# Patient Record
Sex: FEMALE | Race: WHITE
[De-identification: names, ages, dates, MRNs, and addresses within clinical notes are randomized per-mention and may not be internally consistent; named-entity substitution may affect disease eponyms.]

---

## 2017-06-03 ENCOUNTER — TRANSCRIPTION ENCOUNTER (OUTPATIENT)
Age: 37
End: 2017-06-03

## 2017-06-12 ENCOUNTER — APPOINTMENT (OUTPATIENT)
Dept: SURGERY | Facility: CLINIC | Age: 37
End: 2017-06-12

## 2017-06-12 VITALS
OXYGEN SATURATION: 98 % | WEIGHT: 152.31 LBS | RESPIRATION RATE: 16 BRPM | DIASTOLIC BLOOD PRESSURE: 77 MMHG | HEIGHT: 61 IN | HEART RATE: 100 BPM | TEMPERATURE: 97.9 F | BODY MASS INDEX: 28.76 KG/M2 | SYSTOLIC BLOOD PRESSURE: 115 MMHG

## 2017-06-12 DIAGNOSIS — L72.0 EPIDERMAL CYST: ICD-10-CM

## 2017-06-12 DIAGNOSIS — Z86.39 PERSONAL HISTORY OF OTHER ENDOCRINE, NUTRITIONAL AND METABOLIC DISEASE: ICD-10-CM

## 2017-07-01 ENCOUNTER — TRANSCRIPTION ENCOUNTER (OUTPATIENT)
Age: 37
End: 2017-07-01

## 2018-03-06 ENCOUNTER — APPOINTMENT (OUTPATIENT)
Dept: RHEUMATOLOGY | Facility: CLINIC | Age: 38
End: 2018-03-06
Payer: MEDICAID

## 2018-03-06 ENCOUNTER — LABORATORY RESULT (OUTPATIENT)
Age: 38
End: 2018-03-06

## 2018-03-06 VITALS
DIASTOLIC BLOOD PRESSURE: 80 MMHG | SYSTOLIC BLOOD PRESSURE: 118 MMHG | BODY MASS INDEX: 26.43 KG/M2 | HEART RATE: 82 BPM | WEIGHT: 140 LBS | TEMPERATURE: 98.7 F | HEIGHT: 61 IN | OXYGEN SATURATION: 98 %

## 2018-03-06 DIAGNOSIS — R31.29 OTHER MICROSCOPIC HEMATURIA: ICD-10-CM

## 2018-03-06 PROCEDURE — 99205 OFFICE O/P NEW HI 60 MIN: CPT

## 2018-03-09 LAB
ALBUMIN SERPL ELPH-MCNC: 4.5 G/DL
ALP BLD-CCNC: 68 U/L
ALT SERPL-CCNC: 7 U/L
ANA SER IF-ACNC: NEGATIVE
ANION GAP SERPL CALC-SCNC: 13 MMOL/L
APPEARANCE: CLEAR
APTT BLD: 34.8 SEC
AST SERPL-CCNC: 11 U/L
B2 GLYCOPROT1 AB SER QL: NEGATIVE
BACTERIA: NEGATIVE
BASOPHILS # BLD AUTO: 0.04 K/UL
BASOPHILS NFR BLD AUTO: 0.3 %
BILIRUB SERPL-MCNC: 0.3 MG/DL
BILIRUBIN URINE: NEGATIVE
BLOOD URINE: NEGATIVE
BUN SERPL-MCNC: 6 MG/DL
CALCIUM SERPL-MCNC: 9.3 MG/DL
CARDIOLIPIN AB SER IA-ACNC: NEGATIVE
CENTROMERE IGG SER-ACNC: <0.2 AL
CHLORIDE SERPL-SCNC: 101 MMOL/L
CO2 SERPL-SCNC: 25 MMOL/L
COLOR: YELLOW
CREAT SERPL-MCNC: 0.62 MG/DL
CREAT SPEC-SCNC: 73 MG/DL
CREAT/PROT UR: 0.1 RATIO
CRP SERPL-MCNC: 0.2 MG/DL
DSDNA AB SER-ACNC: <12 IU/ML
ENA RNP AB SER IA-ACNC: <0.2 AL
ENA SCL70 IGG SER IA-ACNC: <0.2 AL
ENA SM AB SER IA-ACNC: <0.2 AL
ENA SS-A AB SER IA-ACNC: <0.2 AL
ENA SS-B AB SER IA-ACNC: <0.2 AL
EOSINOPHIL # BLD AUTO: 0.15 K/UL
EOSINOPHIL NFR BLD AUTO: 1.3 %
ERYTHROCYTE [SEDIMENTATION RATE] IN BLOOD BY WESTERGREN METHOD: 14 MM/HR
G6PD SER-CCNC: 13.7 U/G HGB
GLUCOSE QUALITATIVE U: NEGATIVE MG/DL
GLUCOSE SERPL-MCNC: 90 MG/DL
HCT VFR BLD CALC: 43.4 %
HGB BLD-MCNC: 14.4 G/DL
HYALINE CASTS: 0 /LPF
IMM GRANULOCYTES NFR BLD AUTO: 0.3 %
KETONES URINE: NEGATIVE
LEUKOCYTE ESTERASE URINE: NEGATIVE
LYMPHOCYTES # BLD AUTO: 3.91 K/UL
LYMPHOCYTES NFR BLD AUTO: 32.6 %
MAN DIFF?: NORMAL
MCHC RBC-ENTMCNC: 32.2 PG
MCHC RBC-ENTMCNC: 33.2 GM/DL
MCV RBC AUTO: 97.1 FL
MICROSCOPIC-UA: NORMAL
MONOCYTES # BLD AUTO: 0.48 K/UL
MONOCYTES NFR BLD AUTO: 4 %
MPO AB + PR3 PNL SER: NORMAL
NEUTROPHILS # BLD AUTO: 7.37 K/UL
NEUTROPHILS NFR BLD AUTO: 61.5 %
NITRITE URINE: NEGATIVE
PH URINE: 5
PLATELET # BLD AUTO: 278 K/UL
POTASSIUM SERPL-SCNC: 4 MMOL/L
PROT SERPL-MCNC: 7.1 G/DL
PROT UR-MCNC: 5 MG/DL
PROTEIN URINE: NEGATIVE MG/DL
RBC # BLD: 4.47 M/UL
RBC # FLD: 12.7 %
RED BLOOD CELLS URINE: 5 /HPF
RNA POLYMERASE III IGG: <10 U
SODIUM SERPL-SCNC: 139 MMOL/L
SPECIFIC GRAVITY URINE: 1.01
SQUAMOUS EPITHELIAL CELLS: 2 /HPF
THYROGLOB AB SERPL-ACNC: <20 IU/ML
THYROPEROXIDASE AB SERPL IA-ACNC: <10 IU/ML
UROBILINOGEN URINE: NEGATIVE MG/DL
WBC # FLD AUTO: 11.99 K/UL
WHITE BLOOD CELLS URINE: 1 /HPF

## 2018-05-01 ENCOUNTER — APPOINTMENT (OUTPATIENT)
Dept: RHEUMATOLOGY | Facility: CLINIC | Age: 38
End: 2018-05-01
Payer: MEDICAID

## 2018-05-01 VITALS
HEART RATE: 90 BPM | OXYGEN SATURATION: 98 % | HEIGHT: 61 IN | WEIGHT: 146 LBS | BODY MASS INDEX: 27.56 KG/M2 | SYSTOLIC BLOOD PRESSURE: 116 MMHG | DIASTOLIC BLOOD PRESSURE: 84 MMHG | RESPIRATION RATE: 16 BRPM

## 2018-05-01 DIAGNOSIS — M35.00 SICCA SYNDROME, UNSPECIFIED: ICD-10-CM

## 2018-05-01 PROCEDURE — 99214 OFFICE O/P EST MOD 30 MIN: CPT

## 2018-06-15 ENCOUNTER — APPOINTMENT (OUTPATIENT)
Dept: ORTHOPEDIC SURGERY | Facility: CLINIC | Age: 38
End: 2018-06-15

## 2018-06-19 ENCOUNTER — APPOINTMENT (OUTPATIENT)
Dept: RHEUMATOLOGY | Facility: CLINIC | Age: 38
End: 2018-06-19
Payer: MEDICAID

## 2018-06-19 VITALS
TEMPERATURE: 98.4 F | OXYGEN SATURATION: 97 % | HEIGHT: 61 IN | BODY MASS INDEX: 27.75 KG/M2 | SYSTOLIC BLOOD PRESSURE: 116 MMHG | RESPIRATION RATE: 16 BRPM | WEIGHT: 147 LBS | DIASTOLIC BLOOD PRESSURE: 80 MMHG | HEART RATE: 87 BPM

## 2018-06-19 DIAGNOSIS — R20.9 UNSPECIFIED DISTURBANCES OF SKIN SENSATION: ICD-10-CM

## 2018-06-19 DIAGNOSIS — M50.90 CERVICAL DISC DISORDER, UNSPECIFIED, UNSPECIFIED CERVICAL REGION: ICD-10-CM

## 2018-06-19 PROCEDURE — 99214 OFFICE O/P EST MOD 30 MIN: CPT

## 2018-07-11 ENCOUNTER — RX RENEWAL (OUTPATIENT)
Age: 38
End: 2018-07-11

## 2018-08-03 ENCOUNTER — APPOINTMENT (OUTPATIENT)
Dept: RHEUMATOLOGY | Facility: CLINIC | Age: 38
End: 2018-08-03
Payer: MEDICAID

## 2018-08-03 VITALS
DIASTOLIC BLOOD PRESSURE: 83 MMHG | TEMPERATURE: 98.6 F | WEIGHT: 135 LBS | HEART RATE: 77 BPM | OXYGEN SATURATION: 98 % | HEIGHT: 61 IN | BODY MASS INDEX: 25.49 KG/M2 | SYSTOLIC BLOOD PRESSURE: 118 MMHG

## 2018-08-03 DIAGNOSIS — D72.829 ELEVATED WHITE BLOOD CELL COUNT, UNSPECIFIED: ICD-10-CM

## 2018-08-03 DIAGNOSIS — F17.200 NICOTINE DEPENDENCE, UNSPECIFIED, UNCOMPLICATED: ICD-10-CM

## 2018-08-03 PROCEDURE — 99214 OFFICE O/P EST MOD 30 MIN: CPT

## 2018-08-06 PROBLEM — D72.829 LEUKOCYTOSIS: Status: ACTIVE | Noted: 2018-08-06

## 2018-08-06 PROBLEM — F17.200 SMOKING ADDICTION: Status: ACTIVE | Noted: 2018-03-07

## 2019-03-04 ENCOUNTER — TRANSCRIPTION ENCOUNTER (OUTPATIENT)
Age: 39
End: 2019-03-04

## 2019-12-28 ENCOUNTER — TRANSCRIPTION ENCOUNTER (OUTPATIENT)
Age: 39
End: 2019-12-28

## 2021-05-19 ENCOUNTER — TRANSCRIPTION ENCOUNTER (OUTPATIENT)
Age: 41
End: 2021-05-19

## 2021-05-21 ENCOUNTER — TRANSCRIPTION ENCOUNTER (OUTPATIENT)
Age: 41
End: 2021-05-21

## 2022-03-10 ENCOUNTER — TRANSCRIPTION ENCOUNTER (OUTPATIENT)
Age: 42
End: 2022-03-10

## 2022-05-05 ENCOUNTER — NON-APPOINTMENT (OUTPATIENT)
Age: 42
End: 2022-05-05

## 2023-03-28 ENCOUNTER — NON-APPOINTMENT (OUTPATIENT)
Age: 43
End: 2023-03-28

## 2023-06-29 ENCOUNTER — APPOINTMENT (OUTPATIENT)
Dept: RHEUMATOLOGY | Facility: CLINIC | Age: 43
End: 2023-06-29
Payer: COMMERCIAL

## 2023-06-29 ENCOUNTER — LABORATORY RESULT (OUTPATIENT)
Age: 43
End: 2023-06-29

## 2023-06-29 VITALS
BODY MASS INDEX: 29.64 KG/M2 | HEART RATE: 93 BPM | HEIGHT: 61 IN | SYSTOLIC BLOOD PRESSURE: 114 MMHG | OXYGEN SATURATION: 99 % | WEIGHT: 157 LBS | DIASTOLIC BLOOD PRESSURE: 78 MMHG | TEMPERATURE: 98.3 F

## 2023-06-29 DIAGNOSIS — M47.812 SPONDYLOSIS W/OUT MYELOPATHY OR RADICULOPATHY, CERVICAL REGION: ICD-10-CM

## 2023-06-29 DIAGNOSIS — M51.9 UNSPECIFIED THORACIC, THORACOLUMBAR AND LUMBOSACRAL INTERVERTEBRAL DISC DISORDER: ICD-10-CM

## 2023-06-29 PROCEDURE — 36415 COLL VENOUS BLD VENIPUNCTURE: CPT

## 2023-06-29 PROCEDURE — 99204 OFFICE O/P NEW MOD 45 MIN: CPT | Mod: 25

## 2023-06-29 NOTE — ASSESSMENT
[FreeTextEntry1] : 41 y/o female referred to rheumatology for joint pains, rashes, and fatigue.\par Pt reports joint pains in the b/l clavicles, knees, legs (knees down), back pains (herniated discs), described as numbness/tingling/burning. Pains worse with rest. Reports R ankle swelling with walking for a long time. Otherwise denies other joint swelling, redness, warmth.\par Pt reports photosensitivity since age 8 with rash on sun. Chronic fatigue.\par Pt reports wrinkling of her fingertips. Reports changing of skin around the eyes.\par Pt sees neuro for any memory problems.\par \par Pt was previously seen by Dr. Ivey (rheum), last in 8/2018. Autoimmune workup at the time was negative. Pt was treated with gabapentin (dosage limited by sedation and fatigue, now stopped) and Rx'd Cymbalta 30mg/day, (which she never started) but has not followed up since then.  \par \par Pt has polyarthralgia with fatigue. Pt does not have symptoms concerning for inflammatory arthritis or systemic autoimmune diseases, and pt's prior rheumatologic workup in 2018 was negative.\par Pt's joint pins likely combination of arthritis and compressive neuropathy in setting of known cervical and lumbar spondylosis and arthritis.\par Any contribution of fibromyalgia which was previously diagnosed is unclear at this time.\par \par - Obtain labwork to evaluate for signs of inflammatory arthritis\par - Obtain XR b/l hands, shoulders, knees, C-spine, L-spine\par - Rx meloxicam 7.5mg PO daily PRN. I advised that the NSAID should be taken with food.  In addition while taking the prescribed NSAID, no over the counter or other NSAIDs should be used, such as ibuprofen (Motrin or Advil) or naproxen (Aleve) as this can cause stomach upset or other side effects.  If needed for fever or breakthrough pain Tylenol can be used.\par - RTC 2 months for follow up\par

## 2023-06-29 NOTE — HISTORY OF PRESENT ILLNESS
[FreeTextEntry1] : 43 y/o female referred to rheumatology for joint pains, rashes, and fatigue.\par Pt reports joint pains in the b/l clavicles, knees, legs (knees down), back pains (herniated discs), described as numbness/tingling/burning. Pains worse with rest. Reports R ankle swelling with walking for a long time. Otherwise denies other joint swelling, redness, warmth.\par Pt reports photosensitivity since age 8 with rash on sun. Chronic fatigue.\par Pt reports wrinkling of her fingertips. Reports changing of skin around the eyes.\par Pt sees neuro for any memory problems.\par \par Pt was previously seen by Dr. Ivey (rheum), last in 8/2018. Autoimmune workup at the time was negative. Pt was treated with gabapentin (dosage limited by sedation and fatigue, now stopped) and Rx'd Cymbalta 30mg/day, (which she never started) but has not followed up since then.  \par

## 2023-06-30 LAB
ALBUMIN SERPL ELPH-MCNC: 4.7 G/DL
ALP BLD-CCNC: 91 U/L
ALT SERPL-CCNC: 12 U/L
ANION GAP SERPL CALC-SCNC: 13 MMOL/L
AST SERPL-CCNC: 11 U/L
BILIRUB SERPL-MCNC: 0.3 MG/DL
BUN SERPL-MCNC: 7 MG/DL
C3 SERPL-MCNC: 134 MG/DL
C4 SERPL-MCNC: 23 MG/DL
CALCIUM SERPL-MCNC: 9.3 MG/DL
CCP AB SER IA-ACNC: <8 UNITS
CHLORIDE SERPL-SCNC: 107 MMOL/L
CO2 SERPL-SCNC: 22 MMOL/L
CREAT SERPL-MCNC: 0.87 MG/DL
CRP SERPL-MCNC: <3 MG/L
EGFR: 84 ML/MIN/1.73M2
ERYTHROCYTE [SEDIMENTATION RATE] IN BLOOD BY WESTERGREN METHOD: 17 MM/HR
GLUCOSE SERPL-MCNC: 78 MG/DL
POTASSIUM SERPL-SCNC: 4.1 MMOL/L
PROT SERPL-MCNC: 7 G/DL
RF+CCP IGG SER-IMP: NEGATIVE
RHEUMATOID FACT SER QL: <10 IU/ML
SODIUM SERPL-SCNC: 143 MMOL/L

## 2023-07-01 LAB
DSDNA AB SER-ACNC: <12 IU/ML
ENA RNP AB SER IA-ACNC: <0.2 AL
ENA SM AB SER IA-ACNC: <0.2 AL
ENA SS-A AB SER IA-ACNC: <0.2 AL
ENA SS-B AB SER IA-ACNC: <0.2 AL
THYROGLOB AB SERPL-ACNC: <20 IU/ML
THYROPEROXIDASE AB SERPL IA-ACNC: <10 IU/ML

## 2023-07-05 LAB — ANA SER IF-ACNC: NEGATIVE

## 2023-07-07 LAB — 14-3-3 ETA AG SER IA-MCNC: <0.2 NG/ML

## 2023-07-26 ENCOUNTER — NON-APPOINTMENT (OUTPATIENT)
Age: 43
End: 2023-07-26

## 2023-07-26 ENCOUNTER — APPOINTMENT (OUTPATIENT)
Dept: NEUROLOGY | Facility: CLINIC | Age: 43
End: 2023-07-26
Payer: COMMERCIAL

## 2023-07-26 VITALS
WEIGHT: 155 LBS | HEIGHT: 61 IN | SYSTOLIC BLOOD PRESSURE: 122 MMHG | BODY MASS INDEX: 29.27 KG/M2 | DIASTOLIC BLOOD PRESSURE: 80 MMHG

## 2023-07-26 DIAGNOSIS — R47.89 OTHER SPEECH DISTURBANCES: ICD-10-CM

## 2023-07-26 PROCEDURE — 99204 OFFICE O/P NEW MOD 45 MIN: CPT

## 2023-07-26 RX ORDER — MELOXICAM 7.5 MG/1
7.5 TABLET ORAL
Qty: 30 | Refills: 2 | Status: COMPLETED | COMMUNITY
Start: 2023-06-29 | End: 2023-07-26

## 2023-07-26 RX ORDER — DULOXETINE HYDROCHLORIDE 30 MG/1
30 CAPSULE, DELAYED RELEASE PELLETS ORAL
Qty: 30 | Refills: 1 | Status: COMPLETED | COMMUNITY
Start: 2018-08-06 | End: 2023-07-26

## 2023-07-26 RX ORDER — GABAPENTIN 100 MG/1
100 CAPSULE ORAL
Qty: 210 | Refills: 1 | Status: COMPLETED | COMMUNITY
Start: 2018-05-01 | End: 2023-07-26

## 2023-07-26 NOTE — ASSESSMENT
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

## 2023-07-26 NOTE — PHYSICAL EXAM
[General Appearance - Alert] : alert [General Appearance - In No Acute Distress] : in no acute distress [General Appearance - Well-Appearing] : healthy appearing [Oriented To Time, Place, And Person] : oriented to person, place, and time [Impaired Insight] : insight and judgment were intact [Affect] : the affect was normal [Person] : oriented to person [Place] : oriented to place [Time] : oriented to time [Short Term Intact] : short term memory impaired [Remote Intact] : remote memory intact [Registration Intact] : recent registration memory intact [Concentration Intact] : normal concentrating ability [Naming Objects] : no difficulty naming common objects [Repeating Phrases] : no difficulty repeating a phrase [Fluency] : fluency intact [Comprehension] : comprehension intact [Reading] : reading intact [Past History] : adequate knowledge of personal past history [Cranial Nerves Optic (II)] : visual acuity intact bilaterally,  visual fields full to confrontation, pupils equal round and reactive to light [Cranial Nerves Oculomotor (III)] : extraocular motion intact [Cranial Nerves Trigeminal (V)] : facial sensation intact symmetrically [Cranial Nerves Facial (VII)] : face symmetrical [Cranial Nerves Vestibulocochlear (VIII)] : hearing was intact bilaterally [Cranial Nerves Glossopharyngeal (IX)] : tongue and palate midline [Cranial Nerves Accessory (XI - Cranial And Spinal)] : head turning and shoulder shrug symmetric [Cranial Nerves Hypoglossal (XII)] : there was no tongue deviation with protrusion [Motor Tone] : muscle tone was normal in all four extremities [Motor Strength] : muscle strength was normal in all four extremities [No Muscle Atrophy] : normal bulk in all four extremities [Paresis Pronator Drift Right-Sided] : no pronator drift on the right [Paresis Pronator Drift Left-Sided] : no pronator drift on the left [Sensation Tactile Decrease] : light touch was intact [Sensation Pain / Temperature Decrease] : pain and temperature was intact [Romberg's Sign] : Romberg's sign was negtive [Abnormal Walk] : normal gait [Balance] : balance was intact [Past-pointing] : there was no past-pointing [Tremor] : no tremor present [Coordination - Dysmetria Impaired Finger-to-Nose Bilateral] : not present [Coordination - Dysmetria Impaired Heel-to-Shin Bilateral] : not present [2+] : Ankle jerk left 2+ [Plantar Reflex Right Only] : normal on the right [Plantar Reflex Left Only] : normal on the left [FreeTextEntry4] : Short-term recall 2/3 [PERRL With Normal Accommodation] : pupils were equal in size, round, reactive to light, with normal accommodation [Extraocular Movements] : extraocular movements were intact [Full Visual Field] : full visual field

## 2023-07-26 NOTE — HISTORY OF PRESENT ILLNESS
[FreeTextEntry1] : She reports some word finding difficulties going on for at least the last 5 years.  Seems to not be significantly progressive.  Had extensive work-up with another neurologist but unfortunately no records currently available.  She said the only thing that showed up with was that she was positive for C9 gene.  She subsequently saw a genetic counselor and found out that this gene can be associated with frontotemporal dementia and ALS.  She was told that she was at increased risk but no specifics were given to her. \par \par  Her mother was diagnosed with Lewy body dementia at age 59.  No other significant family history\par \par Patient denies any focal weakness or numbness.  She is seeing  is seeing rheumatology for joint pains\par \par Medical history otherwise significant for mild asthma\par \par She does housecleaning

## 2023-08-21 ENCOUNTER — NON-APPOINTMENT (OUTPATIENT)
Age: 43
End: 2023-08-21

## 2023-09-13 ENCOUNTER — APPOINTMENT (OUTPATIENT)
Dept: RHEUMATOLOGY | Facility: CLINIC | Age: 43
End: 2023-09-13
Payer: COMMERCIAL

## 2023-09-13 VITALS
WEIGHT: 155 LBS | HEART RATE: 81 BPM | SYSTOLIC BLOOD PRESSURE: 113 MMHG | BODY MASS INDEX: 29.27 KG/M2 | DIASTOLIC BLOOD PRESSURE: 76 MMHG | HEIGHT: 61 IN | OXYGEN SATURATION: 98 %

## 2023-09-13 PROCEDURE — 99214 OFFICE O/P EST MOD 30 MIN: CPT

## 2023-09-13 RX ORDER — MELOXICAM 7.5 MG/1
7.5 TABLET ORAL
Qty: 30 | Refills: 1 | Status: ACTIVE | COMMUNITY
Start: 2023-09-13 | End: 1900-01-01

## 2023-10-07 ENCOUNTER — NON-APPOINTMENT (OUTPATIENT)
Age: 43
End: 2023-10-07

## 2023-12-13 ENCOUNTER — APPOINTMENT (OUTPATIENT)
Dept: RHEUMATOLOGY | Facility: CLINIC | Age: 43
End: 2023-12-13
Payer: COMMERCIAL

## 2023-12-13 VITALS
HEIGHT: 61 IN | DIASTOLIC BLOOD PRESSURE: 87 MMHG | HEART RATE: 74 BPM | TEMPERATURE: 98 F | BODY MASS INDEX: 27.38 KG/M2 | WEIGHT: 145 LBS | SYSTOLIC BLOOD PRESSURE: 137 MMHG | OXYGEN SATURATION: 98 %

## 2023-12-13 DIAGNOSIS — M79.7 FIBROMYALGIA: ICD-10-CM

## 2023-12-13 DIAGNOSIS — R53.82 CHRONIC FATIGUE, UNSPECIFIED: ICD-10-CM

## 2023-12-13 DIAGNOSIS — M25.50 PAIN IN UNSPECIFIED JOINT: ICD-10-CM

## 2023-12-13 PROCEDURE — 99214 OFFICE O/P EST MOD 30 MIN: CPT

## 2023-12-13 RX ORDER — METHOCARBAMOL 500 MG/1
500 TABLET, FILM COATED ORAL
Qty: 90 | Refills: 2 | Status: ACTIVE | COMMUNITY
Start: 2023-12-13 | End: 1900-01-01

## 2023-12-13 NOTE — PHYSICAL EXAM
[TextEntry] : GENERAL: Appears in no acute distress HEENT: EOMI, PERRLA. No conjunctival erythema. Moist mucous membranes. No nasopharyngeal ulcers NECK: Supple, no cervical lymphadenopathy, no thyromegaly CARDIOVASCULAR: RRR. S1, S2 auscultated. No murmurs or rubs. PULMONARY: Clear to auscultation b/l, no wheezes, rales, or crackles ABDOMINAL: Soft, nontender, nondistended. Bowel sounds present. No organomegaly. MSK: No active synovitis, swelling, erythema, or warmth. No deformities. Normal ROM of neck, back, b/l upper and lower extremities. SKIN: No lesions or rashes NEURO: No focal deficits. PSYCH: AAOx3. Normal affect and thought process.

## 2023-12-13 NOTE — ASSESSMENT
Monitored by specialist- no acute findings meriting change in the plan [FreeTextEntry1] : 44 y/o female presents as follow up for joint pains, fibromyalgia.  Pt reports joint pains in the b/l clavicles, knees, legs (knees down), back pains (herniated discs), described as numbness/tingling/burning. Pains worse with rest. Reports R ankle swelling with walking for a long time. Otherwise denies other joint swelling, redness, warmth.  Pt reports photosensitivity since age 8 with rash on sun. Chronic fatigue.  Pt reports wrinkling of her fingertips. Reports changing of skin around the eyes.  Pt sees neuro for any memory problems.   Pt was previously seen by Dr. Ivey (rheum), last in 8/2018. Autoimmune workup at the time was negative. Pt was treated with gabapentin (dosage limited by sedation and fatigue, now stopped) and Rx'd Cymbalta 30mg/day, (which she never started) but has not followed up since then.  Pt's prior rheumatologic workup in 2018 was negative. Workup by me negative for signs of underlying autoimmune diseases. XRs with C-spine and L-spine arthritis with known cervical and lumbar spondylosis and arthritis.  However, given pt's significant joint pains without major findings in imaging, pt may also have component of fibromyalgia that pt was previously diagnosed with.   Pt states that meloxicam PRN did not help at all with her pains. Pt states that her worst pains are at night after work. Pt still would like to defer long term medication such as Cymbalta for pain control, but would be willing to try muscle relaxants.  - Rx methocarbamol 500mg PO TID PRN. Advised to watch for somnolence.  - Pt to let me know if she would like to try duloxetine/Cymbalta for chronic pain and fibromyalgia.  - Will defer MR C-spine or L-spine at this time, as her neuropathy symptoms are not severe at this time.  - RTC 3 months for follow up.

## 2023-12-13 NOTE — HISTORY OF PRESENT ILLNESS
[FreeTextEntry1] : HISTORY:  42 y/o female presents as follow up for joint pains, fibromyalgia.  Pt reports joint pains in the b/l clavicles, knees, legs (knees down), back pains (herniated discs), described as numbness/tingling/burning. Pains worse with rest. Reports R ankle swelling with walking for a long time. Otherwise denies other joint swelling, redness, warmth.  Pt reports photosensitivity since age 8 with rash on sun. Chronic fatigue.  Pt reports wrinkling of her fingertips. Reports changing of skin around the eyes.  Pt sees neuro for any memory problems.   Pt was previously seen by Dr. Ivey (rheum), last in 8/2018. Autoimmune workup at the time was negative. Pt was treated with gabapentin (dosage limited by sedation and fatigue, now stopped) and Rx'd Cymbalta 30mg/day, (which she never started) but has not followed up since then.  Pt's prior rheumatologic workup in 2018 was negative. Workup by me negative for signs of underlying autoimmune diseases. XRs with C-spine and L-spine arthritis with known cervical and lumbar spondylosis and arthritis.  However, given pt's significant joint pains without major findings in imaging, pt may also have component of fibromyalgia that pt was previously diagnosed with.   INTERVAL HISTORY:  Pt states that meloxicam PRN did not help at all with her pains. Pt states that her worst pains are at night after work. Pt still would like to defer long term medication such as Cymbalta for pain control, but would be willing to try muscle relaxants.  WORKUP:  Remarkable for (6/2023): ESR 17  Normal/neg (6/2023): CBC, CMP, CRP, YARY, dsDNA, SSA, SSB, Dejesus, RNP, C3, C4, Thyroid Ab, RF, CCP, 14.3.3 eta protein, Lyme  XR b/l hands (8/2023): Normal  XR b/l shoulders (8/2023): Normal  XR b/l knees (8/2023): Normal  XR C-spine (8/2023): Straightening of normal cervical lordosis. Mod degenerative changes of C5-C6 and milder degenerative changes of C6-C7  XR L-spine (8/2023): Mild levoscoliosis. Mild multilevel degenerative changes

## 2024-03-11 ENCOUNTER — APPOINTMENT (OUTPATIENT)
Dept: RHEUMATOLOGY | Facility: CLINIC | Age: 44
End: 2024-03-11

## 2025-03-25 ENCOUNTER — NON-APPOINTMENT (OUTPATIENT)
Age: 45
End: 2025-03-25